# Patient Record
Sex: MALE | Race: WHITE
[De-identification: names, ages, dates, MRNs, and addresses within clinical notes are randomized per-mention and may not be internally consistent; named-entity substitution may affect disease eponyms.]

---

## 2017-04-04 ENCOUNTER — HOSPITAL ENCOUNTER (EMERGENCY)
Dept: HOSPITAL 82 - ED | Age: 42
Discharge: HOME | DRG: 603 | End: 2017-04-04
Payer: COMMERCIAL

## 2017-04-04 VITALS — WEIGHT: 233.69 LBS | HEIGHT: 70 IN | BODY MASS INDEX: 33.46 KG/M2

## 2017-04-04 VITALS — SYSTOLIC BLOOD PRESSURE: 122 MMHG | DIASTOLIC BLOOD PRESSURE: 75 MMHG

## 2017-04-04 DIAGNOSIS — B95.62: ICD-10-CM

## 2017-04-04 DIAGNOSIS — L02.01: Primary | ICD-10-CM

## 2017-04-04 LAB
ALBUMIN SERPL-MCNC: 3.3 G/DL (ref 3.2–5)
ALP SERPL-CCNC: 175 U/L (ref 38–126)
ALT SERPL-CCNC: 25 U/L (ref 21–72)
ANION GAP SERPL CALCULATED.3IONS-SCNC: 14 MMOL/L
AST SERPL-CCNC: 30 U/L (ref 17–59)
BASOPHILS NFR BLD AUTO: 0 % (ref 0–3)
BUN SERPL-MCNC: 15 MG/DL (ref 9–20)
BUN/CREAT SERPL: 10
CALCIUM SERPL-MCNC: 8.4 MG/DL (ref 8.4–10.2)
CHLORIDE SERPL-SCNC: 89 MMOL/L (ref 95–108)
CO2 SERPL-SCNC: 29 MMOL/L (ref 22–30)
CREAT SERPL-MCNC: 1.5 MG/DL (ref 0.7–1.3)
EOSINOPHIL NFR BLD AUTO: 1 % (ref 0–8)
ERYTHROCYTE [DISTWIDTH] IN BLOOD BY AUTOMATED COUNT: 15.7 % (ref 11.5–15.5)
GLUCOSE SERPL-MCNC: 371 MG/DL (ref 75–110)
HCT VFR BLD AUTO: 39.2 % (ref 39–50)
HGB BLD-MCNC: 12.7 G/DL (ref 14–18)
IMM GRANULOCYTES NFR BLD: 0.6 % (ref 0–1)
LYMPHOCYTES NFR BLD: 7 % (ref 15–41)
MCH RBC QN AUTO: 28.3 PG  CALC (ref 26–32)
MCHC RBC AUTO-ENTMCNC: 32.4 G/L CALC (ref 32–36)
MCV RBC AUTO: 87.5 FL  CALC (ref 80–100)
MONOCYTES NFR BLD AUTO: 8 % (ref 2–13)
NEUTROPHILS # BLD AUTO: 15.54 THOU/UL (ref 1.82–7.42)
NEUTROPHILS NFR BLD AUTO: 84 % (ref 42–76)
PLATELET # BLD AUTO: 304 THOU/UL (ref 130–400)
POTASSIUM SERPL-SCNC: 3.1 MMOL/L (ref 3.5–5.1)
PROT SERPL-MCNC: 7.2 G/DL (ref 6.3–8.2)
RBC # BLD AUTO: 4.48 MILL/UL (ref 4.7–6.1)
SODIUM SERPL-SCNC: 129 MMOL/L (ref 137–146)

## 2017-04-04 PROCEDURE — 0H91XZZ DRAINAGE OF FACE SKIN, EXTERNAL APPROACH: ICD-10-PCS | Performed by: EMERGENCY MEDICINE

## 2017-04-05 ENCOUNTER — HOSPITAL ENCOUNTER (EMERGENCY)
Dept: HOSPITAL 82 - ED | Age: 42
Discharge: HOME | DRG: 951 | End: 2017-04-05
Payer: COMMERCIAL

## 2017-04-05 VITALS — DIASTOLIC BLOOD PRESSURE: 84 MMHG | SYSTOLIC BLOOD PRESSURE: 149 MMHG

## 2017-04-05 VITALS — BODY MASS INDEX: 33.46 KG/M2 | HEIGHT: 70 IN | WEIGHT: 233.69 LBS

## 2017-04-05 DIAGNOSIS — E11.40: ICD-10-CM

## 2017-04-05 DIAGNOSIS — Z48.01: Primary | ICD-10-CM

## 2017-04-05 DIAGNOSIS — I10: ICD-10-CM

## 2017-04-05 DIAGNOSIS — J44.9: ICD-10-CM

## 2017-04-08 ENCOUNTER — HOSPITAL ENCOUNTER (EMERGENCY)
Dept: HOSPITAL 82 - ED | Age: 42
Discharge: HOME | DRG: 603 | End: 2017-04-08
Payer: COMMERCIAL

## 2017-04-08 VITALS — SYSTOLIC BLOOD PRESSURE: 145 MMHG | DIASTOLIC BLOOD PRESSURE: 103 MMHG

## 2017-04-08 VITALS — WEIGHT: 243.83 LBS | HEIGHT: 70 IN | BODY MASS INDEX: 34.91 KG/M2

## 2017-04-08 DIAGNOSIS — F32.9: ICD-10-CM

## 2017-04-08 DIAGNOSIS — R10.9: ICD-10-CM

## 2017-04-08 DIAGNOSIS — L02.01: Primary | ICD-10-CM

## 2017-04-08 DIAGNOSIS — G89.29: ICD-10-CM

## 2017-04-08 DIAGNOSIS — E11.40: ICD-10-CM

## 2017-04-08 DIAGNOSIS — M54.9: ICD-10-CM

## 2017-04-08 DIAGNOSIS — B95.62: ICD-10-CM

## 2017-04-08 DIAGNOSIS — J44.9: ICD-10-CM

## 2017-04-08 DIAGNOSIS — I10: ICD-10-CM

## 2018-02-09 ENCOUNTER — HOSPITAL ENCOUNTER (EMERGENCY)
Dept: HOSPITAL 82 - ED | Age: 43
Discharge: LEFT BEFORE DISCHARGE | DRG: 558 | End: 2018-02-09
Payer: COMMERCIAL

## 2018-02-09 VITALS — WEIGHT: 260.15 LBS | BODY MASS INDEX: 37.24 KG/M2 | HEIGHT: 70 IN

## 2018-02-09 VITALS — SYSTOLIC BLOOD PRESSURE: 154 MMHG | DIASTOLIC BLOOD PRESSURE: 88 MMHG

## 2018-02-09 DIAGNOSIS — M65.842: Primary | ICD-10-CM
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
## 2018-02-09 DIAGNOSIS — Z91.19: ICD-10-CM

## 2018-02-09 DIAGNOSIS — R22.32: ICD-10-CM

## 2018-02-09 DIAGNOSIS — R00.0: ICD-10-CM
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
## 2018-02-09 LAB
ALBUMIN SERPL-MCNC: 3.2 G/DL (ref 3.2–5)
ALP SERPL-CCNC: 162 U/L (ref 38–126)
ALT SERPL-CCNC: 25 U/L (ref 21–72)
ANION GAP SERPL CALCULATED.3IONS-SCNC: 16 MMOL/L
AST SERPL-CCNC: 27 U/L (ref 17–59)
BASOPHILS NFR BLD AUTO: 0 % (ref 0–3)
BUN SERPL-MCNC: 21 MG/DL (ref 9–20)
BUN/CREAT SERPL: 12
CHLORIDE SERPL-SCNC: 92 MMOL/L (ref 95–108)
CO2 SERPL-SCNC: 29 MMOL/L (ref 22–30)
CREAT SERPL-MCNC: 1.8 MG/DL (ref 0.7–1.3)
EOSINOPHIL NFR BLD AUTO: 1 % (ref 0–8)
ERYTHROCYTE [DISTWIDTH] IN BLOOD BY AUTOMATED COUNT: 14.1 % (ref 11.5–15.5)
HCT VFR BLD AUTO: 42.7 % (ref 39–50)
HGB BLD-MCNC: 14.5 G/DL (ref 14–18)
IMM GRANULOCYTES NFR BLD: 1.9 % (ref 0–1)
LYMPHOCYTES NFR BLD: 6 % (ref 15–41)
MCH RBC QN AUTO: 31.3 PG  CALC (ref 26–32)
MCHC RBC AUTO-ENTMCNC: 34 G/L CALC (ref 32–36)
MCV RBC AUTO: 92 FL  CALC (ref 80–100)
MONOCYTES NFR BLD AUTO: 6 % (ref 2–13)
NEUTROPHILS # BLD AUTO: 12.63 THOU/UL (ref 1.82–7.42)
NEUTROPHILS NFR BLD AUTO: 85 % (ref 42–76)
PLATELET # BLD AUTO: 225 THOU/UL (ref 130–400)
POTASSIUM SERPL-SCNC: 3 MMOL/L (ref 3.5–5.1)
PROT SERPL-MCNC: 6.4 G/DL (ref 6.3–8.2)
RBC # BLD AUTO: 4.64 MILL/UL (ref 4.7–6.1)
SODIUM SERPL-SCNC: 134 MMOL/L (ref 137–146)

## 2019-12-24 VITALS — SYSTOLIC BLOOD PRESSURE: 174 MMHG | DIASTOLIC BLOOD PRESSURE: 108 MMHG

## 2019-12-24 VITALS — DIASTOLIC BLOOD PRESSURE: 92 MMHG | SYSTOLIC BLOOD PRESSURE: 140 MMHG

## 2019-12-24 VITALS — SYSTOLIC BLOOD PRESSURE: 164 MMHG | DIASTOLIC BLOOD PRESSURE: 103 MMHG

## 2019-12-24 LAB
ALBUMIN SERPL-MCNC: 3.7 G/DL (ref 3.2–5)
ALP SERPL-CCNC: 170 U/L (ref 38–126)
ANION GAP SERPL CALCULATED.3IONS-SCNC: 15 MMOL/L
AST SERPL-CCNC: 35 U/L (ref 17–59)
BASOPHILS NFR BLD AUTO: 1 % (ref 0–3)
BUN SERPL-MCNC: 37 MG/DL (ref 9–20)
BUN/CREAT SERPL: 13
CHLORIDE SERPL-SCNC: 98 MMOL/L (ref 95–108)
CO2 SERPL-SCNC: 27 MMOL/L (ref 22–30)
CREAT SERPL-MCNC: 2.8 MG/DL (ref 0.7–1.3)
EOSINOPHIL NFR BLD AUTO: 0 % (ref 0–8)
ERYTHROCYTE [DISTWIDTH] IN BLOOD BY AUTOMATED COUNT: 16.4 % (ref 11.5–15.5)
HCT VFR BLD AUTO: 46 % (ref 39–50)
HGB BLD-MCNC: 14.7 G/DL (ref 14–18)
IMM GRANULOCYTES NFR BLD: 0.3 % (ref 0–5)
LYMPHOCYTES NFR BLD: 10 % (ref 15–41)
MCH RBC QN AUTO: 29.2 PG  CALC (ref 26–32)
MCHC RBC AUTO-ENTMCNC: 32 G/L CALC (ref 32–36)
MCV RBC AUTO: 91.5 FL  CALC (ref 80–100)
MONOCYTES NFR BLD AUTO: 10 % (ref 2–13)
NEUTROPHILS # BLD AUTO: 7.13 THOU/UL (ref 1.82–7.42)
NEUTROPHILS NFR BLD AUTO: 79 % (ref 42–76)
PLATELET # BLD AUTO: 262 THOU/UL (ref 130–400)
POTASSIUM SERPL-SCNC: 3.9 MMOL/L (ref 3.5–5.1)
PROT SERPL-MCNC: 7.6 G/DL (ref 6.3–8.2)
RBC # BLD AUTO: 5.03 MILL/UL (ref 4.7–6.1)
SODIUM SERPL-SCNC: 136 MMOL/L (ref 137–146)

## 2019-12-25 VITALS — SYSTOLIC BLOOD PRESSURE: 140 MMHG | DIASTOLIC BLOOD PRESSURE: 90 MMHG

## 2019-12-25 VITALS — DIASTOLIC BLOOD PRESSURE: 105 MMHG | SYSTOLIC BLOOD PRESSURE: 157 MMHG

## 2019-12-25 VITALS — DIASTOLIC BLOOD PRESSURE: 109 MMHG | SYSTOLIC BLOOD PRESSURE: 158 MMHG

## 2019-12-25 VITALS — DIASTOLIC BLOOD PRESSURE: 109 MMHG | SYSTOLIC BLOOD PRESSURE: 163 MMHG

## 2019-12-25 VITALS — DIASTOLIC BLOOD PRESSURE: 80 MMHG | SYSTOLIC BLOOD PRESSURE: 136 MMHG

## 2019-12-25 VITALS — DIASTOLIC BLOOD PRESSURE: 100 MMHG | SYSTOLIC BLOOD PRESSURE: 149 MMHG

## 2019-12-25 VITALS — SYSTOLIC BLOOD PRESSURE: 145 MMHG | DIASTOLIC BLOOD PRESSURE: 100 MMHG

## 2019-12-25 VITALS — SYSTOLIC BLOOD PRESSURE: 141 MMHG | DIASTOLIC BLOOD PRESSURE: 98 MMHG

## 2019-12-25 VITALS — DIASTOLIC BLOOD PRESSURE: 90 MMHG | SYSTOLIC BLOOD PRESSURE: 119 MMHG

## 2019-12-25 LAB
AMORPH SED URNS QL MICRO: (no result) HPF
ANION GAP SERPL CALCULATED.3IONS-SCNC: 18 MMOL/L
BASOPHILS NFR BLD AUTO: 1 % (ref 0–3)
BILIRUB UR QL STRIP.AUTO: NEGATIVE
BUN SERPL-MCNC: 38 MG/DL (ref 9–20)
BUN/CREAT SERPL: 15
CHLORIDE SERPL-SCNC: 99 MMOL/L (ref 95–108)
CO2 SERPL-SCNC: 24 MMOL/L (ref 22–30)
COLOR UR AUTO: YELLOW
CREAT SERPL-MCNC: 2.6 MG/DL (ref 0.7–1.3)
EOSINOPHIL NFR BLD AUTO: 1 % (ref 0–8)
ERYTHROCYTE [DISTWIDTH] IN BLOOD BY AUTOMATED COUNT: 16.3 % (ref 11.5–15.5)
GLUCOSE UR STRIP.AUTO-MCNC: NEGATIVE MG/DL
HCT VFR BLD AUTO: 47.8 % (ref 39–50)
HGB BLD-MCNC: 15 G/DL (ref 14–18)
HGB UR QL STRIP.AUTO: (no result)
IMM GRANULOCYTES NFR BLD: 0.3 % (ref 0–5)
KETONES UR STRIP.AUTO-MCNC: NEGATIVE MG/DL
LEUKOCYTE ESTERASE UR QL STRIP.AUTO: NEGATIVE
LYMPHOCYTES NFR BLD: 20 % (ref 15–41)
MCH RBC QN AUTO: 29.1 PG  CALC (ref 26–32)
MCHC RBC AUTO-ENTMCNC: 31.4 G/L CALC (ref 32–36)
MCV RBC AUTO: 92.6 FL  CALC (ref 80–100)
MONOCYTES NFR BLD AUTO: 12 % (ref 2–13)
NEUTROPHILS # BLD AUTO: 5.02 THOU/UL (ref 1.82–7.42)
NEUTROPHILS NFR BLD AUTO: 66 % (ref 42–76)
NITRITE UR QL STRIP.AUTO: NEGATIVE
PH UR STRIP.AUTO: 6 [PH] (ref 4.5–8)
PLATELET # BLD AUTO: 263 THOU/UL (ref 130–400)
POTASSIUM SERPL-SCNC: 4 MMOL/L (ref 3.5–5.1)
PROT UR QL STRIP.AUTO: >=300 MG/DL
RBC # BLD AUTO: 5.16 MILL/UL (ref 4.7–6.1)
RBC #/AREA URNS HPF: (no result) RBC/HPF (ref 0–5)
SODIUM SERPL-SCNC: 137 MMOL/L (ref 137–146)
SP GR UR STRIP.AUTO: >=1.03
UROBILINOGEN UR QL STRIP.AUTO: 0.2 E.U./DL
WBC #/AREA URNS HPF: (no result) WBC/HPF (ref 0–5)

## 2019-12-25 PROCEDURE — 0T9B70Z DRAINAGE OF BLADDER WITH DRAINAGE DEVICE, VIA NATURAL OR ARTIFICIAL OPENING: ICD-10-PCS | Performed by: INTERNAL MEDICINE

## 2019-12-26 VITALS — DIASTOLIC BLOOD PRESSURE: 103 MMHG | SYSTOLIC BLOOD PRESSURE: 165 MMHG

## 2019-12-26 VITALS — DIASTOLIC BLOOD PRESSURE: 88 MMHG | SYSTOLIC BLOOD PRESSURE: 130 MMHG

## 2019-12-26 VITALS — SYSTOLIC BLOOD PRESSURE: 149 MMHG | DIASTOLIC BLOOD PRESSURE: 102 MMHG

## 2019-12-26 VITALS — DIASTOLIC BLOOD PRESSURE: 110 MMHG | SYSTOLIC BLOOD PRESSURE: 155 MMHG

## 2019-12-26 VITALS — DIASTOLIC BLOOD PRESSURE: 105 MMHG | SYSTOLIC BLOOD PRESSURE: 165 MMHG

## 2019-12-26 VITALS — DIASTOLIC BLOOD PRESSURE: 107 MMHG | SYSTOLIC BLOOD PRESSURE: 165 MMHG

## 2019-12-26 VITALS — SYSTOLIC BLOOD PRESSURE: 158 MMHG | DIASTOLIC BLOOD PRESSURE: 105 MMHG

## 2019-12-26 VITALS — DIASTOLIC BLOOD PRESSURE: 99 MMHG | SYSTOLIC BLOOD PRESSURE: 150 MMHG

## 2019-12-26 VITALS — DIASTOLIC BLOOD PRESSURE: 78 MMHG | SYSTOLIC BLOOD PRESSURE: 119 MMHG

## 2019-12-26 VITALS — DIASTOLIC BLOOD PRESSURE: 108 MMHG | SYSTOLIC BLOOD PRESSURE: 156 MMHG

## 2019-12-26 VITALS — DIASTOLIC BLOOD PRESSURE: 100 MMHG | SYSTOLIC BLOOD PRESSURE: 147 MMHG

## 2019-12-26 LAB
ANION GAP SERPL CALCULATED.3IONS-SCNC: 14 MMOL/L
BASOPHILS NFR BLD AUTO: 1 % (ref 0–3)
BUN SERPL-MCNC: 40 MG/DL (ref 9–20)
BUN/CREAT SERPL: 16
CHLORIDE SERPL-SCNC: 97 MMOL/L (ref 95–108)
CO2 SERPL-SCNC: 29 MMOL/L (ref 22–30)
CREAT SERPL-MCNC: 2.6 MG/DL (ref 0.7–1.3)
EOSINOPHIL NFR BLD AUTO: 1 % (ref 0–8)
ERYTHROCYTE [DISTWIDTH] IN BLOOD BY AUTOMATED COUNT: 16.4 % (ref 11.5–15.5)
HCT VFR BLD AUTO: 45.1 % (ref 39–50)
HGB BLD-MCNC: 14 G/DL (ref 14–18)
IMM GRANULOCYTES NFR BLD: 0.2 % (ref 0–5)
LYMPHOCYTES NFR BLD: 15 % (ref 15–41)
MAGNESIUM SERPL-MCNC: 1.7 MG/DL (ref 1.6–2.3)
MCH RBC QN AUTO: 29 PG  CALC (ref 26–32)
MCHC RBC AUTO-ENTMCNC: 31 G/L CALC (ref 32–36)
MCV RBC AUTO: 93.6 FL  CALC (ref 80–100)
MONOCYTES NFR BLD AUTO: 11 % (ref 2–13)
NEUTROPHILS # BLD AUTO: 6.31 THOU/UL (ref 1.82–7.42)
NEUTROPHILS NFR BLD AUTO: 72 % (ref 42–76)
PLATELET # BLD AUTO: 221 THOU/UL (ref 130–400)
POTASSIUM SERPL-SCNC: 3.9 MMOL/L (ref 3.5–5.1)
RBC # BLD AUTO: 4.82 MILL/UL (ref 4.7–6.1)
SODIUM SERPL-SCNC: 136 MMOL/L (ref 137–146)

## 2019-12-27 ENCOUNTER — HOSPITAL ENCOUNTER (INPATIENT)
Age: 44
LOS: 3 days | Discharge: NURSING, REHAB OR LONG-TERM CARE | DRG: 291 | End: 2019-12-30
Admitting: INTERNAL MEDICINE
Payer: COMMERCIAL

## 2019-12-27 VITALS — SYSTOLIC BLOOD PRESSURE: 140 MMHG | DIASTOLIC BLOOD PRESSURE: 94 MMHG

## 2019-12-27 VITALS — SYSTOLIC BLOOD PRESSURE: 141 MMHG | DIASTOLIC BLOOD PRESSURE: 91 MMHG

## 2019-12-27 VITALS — DIASTOLIC BLOOD PRESSURE: 86 MMHG | SYSTOLIC BLOOD PRESSURE: 146 MMHG

## 2019-12-27 VITALS — SYSTOLIC BLOOD PRESSURE: 148 MMHG | DIASTOLIC BLOOD PRESSURE: 96 MMHG

## 2019-12-27 VITALS — DIASTOLIC BLOOD PRESSURE: 93 MMHG | SYSTOLIC BLOOD PRESSURE: 136 MMHG

## 2019-12-27 VITALS — SYSTOLIC BLOOD PRESSURE: 167 MMHG | DIASTOLIC BLOOD PRESSURE: 96 MMHG

## 2019-12-27 VITALS — SYSTOLIC BLOOD PRESSURE: 122 MMHG | DIASTOLIC BLOOD PRESSURE: 82 MMHG

## 2019-12-27 VITALS — SYSTOLIC BLOOD PRESSURE: 143 MMHG | DIASTOLIC BLOOD PRESSURE: 92 MMHG

## 2019-12-27 VITALS — DIASTOLIC BLOOD PRESSURE: 115 MMHG | SYSTOLIC BLOOD PRESSURE: 166 MMHG

## 2019-12-27 VITALS — DIASTOLIC BLOOD PRESSURE: 93 MMHG | SYSTOLIC BLOOD PRESSURE: 141 MMHG

## 2019-12-27 VITALS — DIASTOLIC BLOOD PRESSURE: 94 MMHG | SYSTOLIC BLOOD PRESSURE: 140 MMHG

## 2019-12-27 VITALS — DIASTOLIC BLOOD PRESSURE: 90 MMHG | SYSTOLIC BLOOD PRESSURE: 128 MMHG

## 2019-12-27 VITALS — DIASTOLIC BLOOD PRESSURE: 99 MMHG | SYSTOLIC BLOOD PRESSURE: 145 MMHG

## 2019-12-27 VITALS — DIASTOLIC BLOOD PRESSURE: 94 MMHG | SYSTOLIC BLOOD PRESSURE: 149 MMHG

## 2019-12-27 DIAGNOSIS — I16.0: ICD-10-CM

## 2019-12-27 DIAGNOSIS — F17.200: ICD-10-CM

## 2019-12-27 DIAGNOSIS — J43.9: ICD-10-CM

## 2019-12-27 DIAGNOSIS — I50.23: ICD-10-CM

## 2019-12-27 DIAGNOSIS — E11.65: ICD-10-CM

## 2019-12-27 DIAGNOSIS — E11.22: ICD-10-CM

## 2019-12-27 DIAGNOSIS — I13.0: Primary | ICD-10-CM

## 2019-12-27 DIAGNOSIS — M54.9: ICD-10-CM

## 2019-12-27 DIAGNOSIS — J18.9: ICD-10-CM

## 2019-12-27 DIAGNOSIS — B96.20: ICD-10-CM

## 2019-12-27 DIAGNOSIS — K21.9: ICD-10-CM

## 2019-12-27 DIAGNOSIS — K59.00: ICD-10-CM

## 2019-12-27 DIAGNOSIS — N17.9: ICD-10-CM

## 2019-12-27 DIAGNOSIS — K46.9: ICD-10-CM

## 2019-12-27 DIAGNOSIS — Z91.11: ICD-10-CM

## 2019-12-27 DIAGNOSIS — I44.7: ICD-10-CM

## 2019-12-27 DIAGNOSIS — B96.1: ICD-10-CM

## 2019-12-27 DIAGNOSIS — N18.3: ICD-10-CM

## 2019-12-27 DIAGNOSIS — N39.0: ICD-10-CM

## 2019-12-27 DIAGNOSIS — I42.9: ICD-10-CM

## 2019-12-27 DIAGNOSIS — G89.29: ICD-10-CM

## 2019-12-27 LAB
ANION GAP SERPL CALCULATED.3IONS-SCNC: 16 MMOL/L
BACTERIA #/AREA URNS HPF: (no result) HPF
BARBITURATES UR-MCNC: NEGATIVE UG/ML
BASOPHILS NFR BLD AUTO: 0 % (ref 0–3)
BILIRUB UR QL STRIP.AUTO: NEGATIVE
BUN SERPL-MCNC: 41 MG/DL (ref 9–20)
BUN/CREAT SERPL: 17
CHLORIDE SERPL-SCNC: 93 MMOL/L (ref 95–108)
CLARITY UR: (no result)
CO2 SERPL-SCNC: 28 MMOL/L (ref 22–30)
COCAINE UR-MCNC: NEGATIVE NG/ML
COLOR UR AUTO: YELLOW
CREAT SERPL-MCNC: 2.4 MG/DL (ref 0.7–1.3)
EOSINOPHIL NFR BLD AUTO: 0 % (ref 0–8)
ERYTHROCYTE [DISTWIDTH] IN BLOOD BY AUTOMATED COUNT: 16.3 % (ref 11.5–15.5)
GLUCOSE UR STRIP.AUTO-MCNC: NEGATIVE MG/DL
HCT VFR BLD AUTO: 44.2 % (ref 39–50)
HGB BLD-MCNC: 13.7 G/DL (ref 14–18)
HGB UR QL STRIP.AUTO: (no result)
IMM GRANULOCYTES NFR BLD: 0.5 % (ref 0–5)
KETONES UR STRIP.AUTO-MCNC: NEGATIVE MG/DL
LEUKOCYTE ESTERASE UR QL STRIP.AUTO: (no result)
LYMPHOCYTES NFR BLD: 5 % (ref 15–41)
MAGNESIUM SERPL-MCNC: 1.6 MG/DL (ref 1.6–2.3)
MCH RBC QN AUTO: 29 PG  CALC (ref 26–32)
MCHC RBC AUTO-ENTMCNC: 31 G/L CALC (ref 32–36)
MCV RBC AUTO: 93.4 FL  CALC (ref 80–100)
METHADONE SERPL-MCNC: NEGATIVE NG/ML
MONOCYTES NFR BLD AUTO: 10 % (ref 2–13)
NEUTROPHILS # BLD AUTO: 10.77 THOU/UL (ref 1.82–7.42)
NEUTROPHILS NFR BLD AUTO: 84 % (ref 42–76)
NITRITE UR QL STRIP.AUTO: POSITIVE
OXCYCODONE: NEGATIVE
PH UR STRIP.AUTO: 6.5 [PH] (ref 4.5–8)
PLATELET # BLD AUTO: 218 THOU/UL (ref 130–400)
POTASSIUM SERPL-SCNC: 3.7 MMOL/L (ref 3.5–5.1)
PROT UR STRIP.AUTO-MCNC: 100 MG/DL
RBC # BLD AUTO: 4.73 MILL/UL (ref 4.7–6.1)
SODIUM SERPL-SCNC: 134 MMOL/L (ref 137–146)
SP GR UR STRIP.AUTO: >=1.03
TETRAHYDROCANNABIONOL: NEGATIVE
TRICYLIC ANTIDEPRESSANTS: NEGATIVE
UROBILINOGEN UR QL STRIP.AUTO: 1 E.U./DL
WBC #/AREA URNS HPF: (no result) WBC/HPF (ref 0–5)

## 2019-12-27 PROCEDURE — P9047 ALBUMIN (HUMAN), 25%, 50ML: HCPCS

## 2019-12-28 VITALS — DIASTOLIC BLOOD PRESSURE: 80 MMHG | SYSTOLIC BLOOD PRESSURE: 135 MMHG

## 2019-12-28 VITALS — SYSTOLIC BLOOD PRESSURE: 148 MMHG | DIASTOLIC BLOOD PRESSURE: 92 MMHG

## 2019-12-28 VITALS — SYSTOLIC BLOOD PRESSURE: 134 MMHG | DIASTOLIC BLOOD PRESSURE: 86 MMHG

## 2019-12-28 VITALS — SYSTOLIC BLOOD PRESSURE: 124 MMHG | DIASTOLIC BLOOD PRESSURE: 84 MMHG

## 2019-12-28 VITALS — SYSTOLIC BLOOD PRESSURE: 135 MMHG | DIASTOLIC BLOOD PRESSURE: 75 MMHG

## 2019-12-28 VITALS — SYSTOLIC BLOOD PRESSURE: 178 MMHG | DIASTOLIC BLOOD PRESSURE: 91 MMHG

## 2019-12-28 VITALS — SYSTOLIC BLOOD PRESSURE: 136 MMHG | DIASTOLIC BLOOD PRESSURE: 75 MMHG

## 2019-12-28 VITALS — SYSTOLIC BLOOD PRESSURE: 122 MMHG | DIASTOLIC BLOOD PRESSURE: 88 MMHG

## 2019-12-28 VITALS — DIASTOLIC BLOOD PRESSURE: 84 MMHG | SYSTOLIC BLOOD PRESSURE: 149 MMHG

## 2019-12-28 VITALS — DIASTOLIC BLOOD PRESSURE: 95 MMHG | SYSTOLIC BLOOD PRESSURE: 162 MMHG

## 2019-12-28 VITALS — SYSTOLIC BLOOD PRESSURE: 160 MMHG | DIASTOLIC BLOOD PRESSURE: 90 MMHG

## 2019-12-28 VITALS — DIASTOLIC BLOOD PRESSURE: 90 MMHG | SYSTOLIC BLOOD PRESSURE: 150 MMHG

## 2019-12-28 VITALS — SYSTOLIC BLOOD PRESSURE: 148 MMHG | DIASTOLIC BLOOD PRESSURE: 99 MMHG

## 2019-12-28 VITALS — SYSTOLIC BLOOD PRESSURE: 155 MMHG | DIASTOLIC BLOOD PRESSURE: 80 MMHG

## 2019-12-28 VITALS — SYSTOLIC BLOOD PRESSURE: 140 MMHG | DIASTOLIC BLOOD PRESSURE: 93 MMHG

## 2019-12-28 VITALS — SYSTOLIC BLOOD PRESSURE: 144 MMHG | DIASTOLIC BLOOD PRESSURE: 90 MMHG

## 2019-12-28 VITALS — DIASTOLIC BLOOD PRESSURE: 87 MMHG | SYSTOLIC BLOOD PRESSURE: 148 MMHG

## 2019-12-28 LAB
ANION GAP SERPL CALCULATED.3IONS-SCNC: 16 MMOL/L
BUN SERPL-MCNC: 43 MG/DL (ref 9–20)
BUN/CREAT SERPL: 18
CHLORIDE SERPL-SCNC: 93 MMOL/L (ref 95–108)
CO2 SERPL-SCNC: 28 MMOL/L (ref 22–30)
CREAT SERPL-MCNC: 2.5 MG/DL (ref 0.7–1.3)
ERYTHROCYTE [DISTWIDTH] IN BLOOD BY AUTOMATED COUNT: 16.3 % (ref 11.5–15.5)
HCT VFR BLD AUTO: 41.4 % (ref 39–50)
HGB BLD-MCNC: 13 G/DL (ref 14–18)
MAGNESIUM SERPL-MCNC: 1.6 MG/DL (ref 1.6–2.3)
MCH RBC QN AUTO: 29.3 PG  CALC (ref 26–32)
MCHC RBC AUTO-ENTMCNC: 31.4 G/L CALC (ref 32–36)
MCV RBC AUTO: 93.2 FL  CALC (ref 80–100)
PLATELET # BLD AUTO: 181 THOU/UL (ref 130–400)
POTASSIUM SERPL-SCNC: 3.4 MMOL/L (ref 3.5–5.1)
RBC # BLD AUTO: 4.44 MILL/UL (ref 4.7–6.1)
SODIUM SERPL-SCNC: 133 MMOL/L (ref 137–146)

## 2019-12-29 VITALS — DIASTOLIC BLOOD PRESSURE: 72 MMHG | SYSTOLIC BLOOD PRESSURE: 120 MMHG

## 2019-12-29 VITALS — DIASTOLIC BLOOD PRESSURE: 75 MMHG | SYSTOLIC BLOOD PRESSURE: 99 MMHG

## 2019-12-29 VITALS — DIASTOLIC BLOOD PRESSURE: 69 MMHG | SYSTOLIC BLOOD PRESSURE: 133 MMHG

## 2019-12-29 VITALS — SYSTOLIC BLOOD PRESSURE: 127 MMHG | DIASTOLIC BLOOD PRESSURE: 73 MMHG

## 2019-12-29 VITALS — SYSTOLIC BLOOD PRESSURE: 103 MMHG | DIASTOLIC BLOOD PRESSURE: 66 MMHG

## 2019-12-29 VITALS — SYSTOLIC BLOOD PRESSURE: 105 MMHG | DIASTOLIC BLOOD PRESSURE: 70 MMHG

## 2019-12-29 VITALS — SYSTOLIC BLOOD PRESSURE: 134 MMHG | DIASTOLIC BLOOD PRESSURE: 87 MMHG

## 2019-12-29 VITALS — SYSTOLIC BLOOD PRESSURE: 100 MMHG | DIASTOLIC BLOOD PRESSURE: 66 MMHG

## 2019-12-29 VITALS — DIASTOLIC BLOOD PRESSURE: 74 MMHG | SYSTOLIC BLOOD PRESSURE: 123 MMHG

## 2019-12-29 VITALS — DIASTOLIC BLOOD PRESSURE: 68 MMHG | SYSTOLIC BLOOD PRESSURE: 103 MMHG

## 2019-12-29 VITALS — DIASTOLIC BLOOD PRESSURE: 78 MMHG | SYSTOLIC BLOOD PRESSURE: 118 MMHG

## 2019-12-29 VITALS — SYSTOLIC BLOOD PRESSURE: 198 MMHG | DIASTOLIC BLOOD PRESSURE: 134 MMHG

## 2019-12-29 LAB
ALBUMIN SERPL-MCNC: 3.5 G/DL (ref 3.2–5)
ALP SERPL-CCNC: 124 U/L (ref 38–126)
ANION GAP SERPL CALCULATED.3IONS-SCNC: 16 MMOL/L
AST SERPL-CCNC: 178 U/L (ref 17–59)
BASOPHILS NFR BLD AUTO: 1 % (ref 0–3)
BUN SERPL-MCNC: 50 MG/DL (ref 9–20)
BUN/CREAT SERPL: 19
CHLORIDE SERPL-SCNC: 92 MMOL/L (ref 95–108)
CO2 SERPL-SCNC: 29 MMOL/L (ref 22–30)
CREAT SERPL-MCNC: 2.7 MG/DL (ref 0.7–1.3)
EOSINOPHIL NFR BLD AUTO: 0 % (ref 0–8)
ERYTHROCYTE [DISTWIDTH] IN BLOOD BY AUTOMATED COUNT: 16.9 % (ref 11.5–15.5)
HCT VFR BLD AUTO: 42.1 % (ref 39–50)
HGB BLD-MCNC: 12.9 G/DL (ref 14–18)
IMM GRANULOCYTES NFR BLD: 1.1 % (ref 0–5)
LYMPHOCYTES NFR BLD: 6 % (ref 15–41)
MCH RBC QN AUTO: 28.8 PG  CALC (ref 26–32)
MCHC RBC AUTO-ENTMCNC: 30.6 G/L CALC (ref 32–36)
MCV RBC AUTO: 94 FL  CALC (ref 80–100)
MONOCYTES NFR BLD AUTO: 15 % (ref 2–13)
NEUTROPHILS # BLD AUTO: 4.27 THOU/UL (ref 1.82–7.42)
NEUTROPHILS NFR BLD AUTO: 77 % (ref 42–76)
PLATELET # BLD AUTO: 173 THOU/UL (ref 130–400)
POTASSIUM SERPL-SCNC: 3.5 MMOL/L (ref 3.5–5.1)
PROT SERPL-MCNC: 6.8 G/DL (ref 6.3–8.2)
RBC # BLD AUTO: 4.48 MILL/UL (ref 4.7–6.1)
SODIUM SERPL-SCNC: 133 MMOL/L (ref 137–146)

## 2019-12-30 VITALS — DIASTOLIC BLOOD PRESSURE: 68 MMHG | SYSTOLIC BLOOD PRESSURE: 117 MMHG

## 2019-12-30 VITALS — SYSTOLIC BLOOD PRESSURE: 121 MMHG | DIASTOLIC BLOOD PRESSURE: 71 MMHG

## 2019-12-30 VITALS — SYSTOLIC BLOOD PRESSURE: 92 MMHG | DIASTOLIC BLOOD PRESSURE: 67 MMHG

## 2019-12-30 VITALS — SYSTOLIC BLOOD PRESSURE: 134 MMHG | DIASTOLIC BLOOD PRESSURE: 84 MMHG

## 2019-12-30 VITALS — SYSTOLIC BLOOD PRESSURE: 106 MMHG | DIASTOLIC BLOOD PRESSURE: 70 MMHG

## 2019-12-30 VITALS — SYSTOLIC BLOOD PRESSURE: 124 MMHG | DIASTOLIC BLOOD PRESSURE: 79 MMHG

## 2019-12-30 VITALS — DIASTOLIC BLOOD PRESSURE: 52 MMHG | SYSTOLIC BLOOD PRESSURE: 100 MMHG

## 2019-12-30 VITALS — DIASTOLIC BLOOD PRESSURE: 71 MMHG | SYSTOLIC BLOOD PRESSURE: 140 MMHG

## 2019-12-30 VITALS — SYSTOLIC BLOOD PRESSURE: 106 MMHG | DIASTOLIC BLOOD PRESSURE: 61 MMHG

## 2019-12-30 LAB
ANION GAP SERPL CALCULATED.3IONS-SCNC: 15 MMOL/L
BUN SERPL-MCNC: 59 MG/DL (ref 9–20)
BUN/CREAT SERPL: 20
CHLORIDE SERPL-SCNC: 92 MMOL/L (ref 95–108)
CO2 SERPL-SCNC: 32 MMOL/L (ref 22–30)
CREAT SERPL-MCNC: 3 MG/DL (ref 0.7–1.3)
ERYTHROCYTE [DISTWIDTH] IN BLOOD BY AUTOMATED COUNT: 16.4 % (ref 11.5–15.5)
HCT VFR BLD AUTO: 43.5 % (ref 39–50)
HGB BLD-MCNC: 13.5 G/DL (ref 14–18)
MCH RBC QN AUTO: 28.9 PG  CALC (ref 26–32)
MCHC RBC AUTO-ENTMCNC: 31 G/L CALC (ref 32–36)
MCV RBC AUTO: 93.1 FL  CALC (ref 80–100)
PLATELET # BLD AUTO: 218 THOU/UL (ref 130–400)
POTASSIUM SERPL-SCNC: 3.3 MMOL/L (ref 3.5–5.1)
RBC # BLD AUTO: 4.67 MILL/UL (ref 4.7–6.1)
SODIUM SERPL-SCNC: 135 MMOL/L (ref 137–146)

## 2020-09-01 ENCOUNTER — HOSPITAL ENCOUNTER (OUTPATIENT)
Dept: HOSPITAL 82 - ED | Age: 45
Setting detail: OBSERVATION
LOS: 1 days | Discharge: HOME | End: 2020-09-02
Attending: INTERNAL MEDICINE | Admitting: INTERNAL MEDICINE
Payer: MEDICARE

## 2020-09-01 VITALS — DIASTOLIC BLOOD PRESSURE: 85 MMHG | SYSTOLIC BLOOD PRESSURE: 146 MMHG

## 2020-09-01 VITALS — DIASTOLIC BLOOD PRESSURE: 94 MMHG | SYSTOLIC BLOOD PRESSURE: 153 MMHG

## 2020-09-01 VITALS — DIASTOLIC BLOOD PRESSURE: 95 MMHG | SYSTOLIC BLOOD PRESSURE: 152 MMHG

## 2020-09-01 VITALS — WEIGHT: 264.55 LBS | HEIGHT: 70 IN | BODY MASS INDEX: 37.87 KG/M2

## 2020-09-01 DIAGNOSIS — K21.9: ICD-10-CM

## 2020-09-01 DIAGNOSIS — T82.42XA: Primary | ICD-10-CM

## 2020-09-01 DIAGNOSIS — D63.8: ICD-10-CM

## 2020-09-01 DIAGNOSIS — F17.200: ICD-10-CM

## 2020-09-01 DIAGNOSIS — E11.22: ICD-10-CM

## 2020-09-01 DIAGNOSIS — E21.3: ICD-10-CM

## 2020-09-01 DIAGNOSIS — J43.9: ICD-10-CM

## 2020-09-01 DIAGNOSIS — Z95.0: ICD-10-CM

## 2020-09-01 DIAGNOSIS — Z11.59: ICD-10-CM

## 2020-09-01 DIAGNOSIS — I13.2: ICD-10-CM

## 2020-09-01 DIAGNOSIS — Z99.2: ICD-10-CM

## 2020-09-01 DIAGNOSIS — N18.6: ICD-10-CM
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
## 2020-09-01 DIAGNOSIS — Z79.4: ICD-10-CM

## 2020-09-01 DIAGNOSIS — I25.10: ICD-10-CM

## 2020-09-01 DIAGNOSIS — I50.23: ICD-10-CM

## 2020-09-01 LAB
ANION GAP SERPL CALCULATED.3IONS-SCNC: 23 MMOL/L
BASOPHILS NFR BLD AUTO: 1 % (ref 0–3)
BUN SERPL-MCNC: 79 MG/DL (ref 9–20)
BUN/CREAT SERPL: 8
CHLORIDE SERPL-SCNC: 97 MMOL/L (ref 95–108)
CO2 SERPL-SCNC: 18 MMOL/L (ref 22–30)
CREAT SERPL-MCNC: 9.3 MG/DL (ref 0.7–1.3)
EOSINOPHIL NFR BLD AUTO: 1 % (ref 0–8)
ERYTHROCYTE [DISTWIDTH] IN BLOOD BY AUTOMATED COUNT: 13.8 % (ref 11.5–15.5)
HCT VFR BLD AUTO: 36.3 % (ref 39–50)
HGB BLD-MCNC: 11.7 G/DL (ref 14–18)
IMM GRANULOCYTES NFR BLD: 0.6 % (ref 0–5)
INR PPP: 1.4 RATIO (ref 0.7–1.3)
LYMPHOCYTES NFR BLD: 9 % (ref 15–41)
MCH RBC QN AUTO: 31.3 PG  CALC (ref 26–32)
MCHC RBC AUTO-ENTMCNC: 32.2 G/DL CAL (ref 32–36)
MCV RBC AUTO: 97.1 FL  CALC (ref 80–100)
MONOCYTES NFR BLD AUTO: 7 % (ref 2–13)
NEUTROPHILS # BLD AUTO: 7.36 THOU/UL (ref 1.82–7.42)
NEUTROPHILS NFR BLD AUTO: 82 % (ref 42–76)
PLATELET # BLD AUTO: 140 THOU/UL (ref 130–400)
POTASSIUM SERPL-SCNC: 4.9 MMOL/L (ref 3.5–5.1)
PROTHROMBIN TIME: 13.5 SECONDS (ref 9–12.5)
RBC # BLD AUTO: 3.74 MILL/UL (ref 4.7–6.1)
SODIUM SERPL-SCNC: 133 MMOL/L (ref 137–146)

## 2020-09-01 PROCEDURE — 0J2TXYZ CHANGE OTHER DEVICE IN TRUNK SUBCUTANEOUS TISSUE AND FASCIA, EXTERNAL APPROACH: ICD-10-PCS | Performed by: SURGERY

## 2020-09-01 NOTE — NUR
ASSESSMENT COMPLETED. PT. C/O RIGHT KNEE PAIN AND REQUESTS PRN TYLENOL, HE
DOES NOT WANT TRAMADOL; MEDICATED WITH TYLENOL AS PER ORDER; WILL REASSESS.
DENIES ANY PAIN TO NEWLY PLACED TESSIO CATHETER TO RIGHT CHEST; INTACT WITH
DRESSING CDI. BANDAGE NOTED TO RIGHT SIDE OF NECK WHERE PREVIOUS TESSIO
CATHETER WAS REMOVED; SMALL CIRCULAR SPOT NOTED WITH BLOOD AND IS OUTLINED TO
MONITOR. UPDATED ON POC. VSS. ENCOURAGED TO CALL FOR ANY NEEDS. HOB ELEVATED
AS PER ORDER. CALL LIGHT IS IN REACH. WILL CONTIUNE TO [place].

## 2020-09-01 NOTE — NUR
PT ASSESSED.  NO ACTIVE BLEEDING.  PT REPORTS DIALYSIS CATHETAR BLEEDING TODAY
AFTER DIALYSIS STARTED.  PT AO X 3.  SKIN PINK WARM AND DRY.

## 2020-09-01 NOTE — NUR
VSS. NO DISTRESS NOTED. PT. REQUESTED ALLERGY MEDICATION AT NIGHT AS HE TAKES
IT AT HOME; MEDICATED AT THIS TIME AS PER EMAR. PO FLUIDS OFFERED.

## 2020-09-01 NOTE — NUR
PT ARRIVES TO ROOM 270 FROM ER VIA WHEELCHAIR ACCOMPANIED BY NURSE LUIS.  PT
IS ALERT AND ORIENTED X 3, AMBULATORY.  LUNGS CLEAR, RA.  RIGHT CHEST CATHETER
SITE COVERED BY TAPE, PT EXPLAINS THAT IT WAS PULLED OUT 6 CM BY MISTAKE.  PT
AWARE OF PENDING SURGERY LATER TODAY, MAINTAINS NPO STATUS.

## 2020-09-02 VITALS — SYSTOLIC BLOOD PRESSURE: 152 MMHG | DIASTOLIC BLOOD PRESSURE: 88 MMHG

## 2020-09-02 VITALS — DIASTOLIC BLOOD PRESSURE: 84 MMHG | SYSTOLIC BLOOD PRESSURE: 147 MMHG

## 2020-09-02 LAB
ANION GAP SERPL CALCULATED.3IONS-SCNC: 25 MMOL/L
BUN SERPL-MCNC: 86 MG/DL (ref 9–20)
BUN/CREAT SERPL: 9
CHLORIDE SERPL-SCNC: 100 MMOL/L (ref 95–108)
CO2 SERPL-SCNC: 13 MMOL/L (ref 22–30)
CREAT SERPL-MCNC: 9.6 MG/DL (ref 0.7–1.3)
ERYTHROCYTE [DISTWIDTH] IN BLOOD BY AUTOMATED COUNT: 13.7 % (ref 11.5–15.5)
HCT VFR BLD AUTO: 35.3 % (ref 39–50)
HGB BLD-MCNC: 11.4 G/DL (ref 14–18)
MCH RBC QN AUTO: 31.3 PG  CALC (ref 26–32)
MCHC RBC AUTO-ENTMCNC: 32.3 G/DL CAL (ref 32–36)
MCV RBC AUTO: 97 FL  CALC (ref 80–100)
PLATELET # BLD AUTO: 115 THOU/UL (ref 130–400)
POTASSIUM SERPL-SCNC: 4.6 MMOL/L (ref 3.5–5.1)
RBC # BLD AUTO: 3.64 MILL/UL (ref 4.7–6.1)
SODIUM SERPL-SCNC: 133 MMOL/L (ref 137–146)

## 2020-09-02 NOTE — NUR
PT SLEEPING IN BED. AWAKENED TO COMPLETE ASSESSMENT. A&O X3. NO DISTRESS
NOTED. PT DENIES ANY PAIN AT THIS TIME. DRESSING CDI. BILATERAL SCDS IN PLACE.
NO OTHER NEEDS AT THIS TIME. ASSESSMENT COMPLETED DISCUSSED POC. CALL LIGHT IN
REACH. CONTINUE TO MONITOR.

## 2020-09-02 NOTE — NUR
Discharge instructions given. Patient verbalizes understanding of same.
Discharged in stable condition via Wheelchair to home accompanied by staff.
All belongings sent with pt.